# Patient Record
Sex: MALE | Race: WHITE | HISPANIC OR LATINO | Employment: FULL TIME | URBAN - METROPOLITAN AREA
[De-identification: names, ages, dates, MRNs, and addresses within clinical notes are randomized per-mention and may not be internally consistent; named-entity substitution may affect disease eponyms.]

---

## 2024-01-24 ENCOUNTER — THERAPY VISIT (OUTPATIENT)
Dept: PHYSICAL THERAPY | Facility: CLINIC | Age: 24
End: 2024-01-24

## 2024-01-24 DIAGNOSIS — M25.521 RIGHT ELBOW PAIN: Primary | ICD-10-CM

## 2024-01-24 PROCEDURE — 97161 PT EVAL LOW COMPLEX 20 MIN: CPT | Mod: GP | Performed by: PHYSICAL THERAPIST

## 2024-01-24 PROCEDURE — 97110 THERAPEUTIC EXERCISES: CPT | Mod: GP | Performed by: PHYSICAL THERAPIST

## 2024-01-24 PROCEDURE — 20560 NDL INSJ W/O NJX 1 OR 2 MUSC: CPT | Performed by: PHYSICAL THERAPIST

## 2024-01-24 NOTE — PROGRESS NOTES
"PHYSICAL THERAPY EVALUATION  Type of Visit: Evaluation    See electronic medical record for Abuse and Falls Screening details.    Subjective     R elbow pain happened in 2023 when his elbow was hyper-extended during a catch while playing [employer] (Soccer).  He \"babied it\" through the season and has been resting it since November.  Pain in the anterior elbow with quick elbow extension, pain and clicking noted in the posterior elbow with twisting (Patient demonstrates pronation/supination). Patient reports he can lift heavy performing suitcase carry, biceps curls, overhead press without pain, however he notices the R elbow. Patient reports he got an MRI that did not find anything. Patient reports he is wondering if he might have tennis elbow. No pain with gripping noted.  Nothing in past medical history, patient is taking creatine. No medications. Pain is 0/10 at rest, however elevates with use.   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   Hobbies/Interests:  Soccer    Patient goals for therapy:  return to high level activity without pain.     Pain assessment: Location: Anterior/lateral elbow/Ratin/10     Objective   Observation: no visible bruising.  AROM: R elbow flexion 90% with end range pain/discomfort in anterior lateral elbow, L: WNL and pain free, Elbow extension 10 degrees hyperextension, L: WNL and pain free.   PROM: R elbow flexion limited per pain. Extension, pronation, and supination full and pain free.   Palpation: pain and tightness to palpation of " R distal biceps. All other arm and forearm muscles WNL.   Special tests: 3rd resisted digit extension normal.  Resisted digit extension for 4th and 5th digits are somewhat painful along posterior medial forearm.   MMT: Biceps 5/5, Triceps 5/5.   Joint assessment: Radioulnar, radiohumeral, and humeroulnar joints slightly restricted, however did not reproduce pain.     Assessment & Plan   CLINICAL IMPRESSIONS  Medical Diagnosis:      Treatment Diagnosis: R Elbow Pain   Impression/Assessment: Patient is a 23 year old male with R elbow complaints.  The following significant findings have been identified: Pain, Decreased ROM/flexibility, and Decreased joint mobility. These impairments interfere with their ability to perform self care tasks, work tasks, recreational activities, household chores, and driving  as compared to previous level of function.     Clinical Decision Making (Complexity):  Clinical Presentation: Stable/Uncomplicated  Clinical Presentation Rationale: based on medical and personal factors listed in PT evaluation  Clinical Decision Making (Complexity): Low complexity    PLAN OF CARE  Treatment Interventions:  Modalities: Cryotherapy, Dry Needling, E-stim, Hot Pack, Mechanical Traction, Ultrasound  Interventions: Manual Therapy, Neuromuscular Re-education, Therapeutic Activity, Therapeutic Exercise, Self-Care/Home Management    Long Term Goals     PT Goal 1  Goal Identifier: AROM  Goal Description: Patient will demonstrate full AROM/PROM of R elbow without pain  Rationale: to maximize safety and independence with performance of ADLs and functional tasks;to maximize safety and independence within the home;to maximize safety and independence within the community;to maximize safety and independence with self cares  Goal Progress: Pain with end range flexion  Target Date: 02/07/24      Frequency of Treatment: 2 times a week  Duration of Treatment: for 1 week tapering to 1 time a week for 1 week    Education  Assessment:   Learner/Method: Patient;Listening;Demonstration;Reading;Pictures/Video;No Barriers to Learning  Education Comments: Educated on findings of exam and likely frequency/duration of PT POC.    Risks and benefits of evaluation/treatment have been explained.   Patient/Family/caregiver agrees with Plan of Care.     Evaluation Time: 20 minutes low complexity evaluation        Signing Clinician: Sheila Cottrell, PT

## 2024-01-26 PROBLEM — M25.521 RIGHT ELBOW PAIN: Status: ACTIVE | Noted: 2024-01-26

## 2024-01-29 ENCOUNTER — THERAPY VISIT (OUTPATIENT)
Dept: PHYSICAL THERAPY | Facility: CLINIC | Age: 24
End: 2024-01-29

## 2024-01-29 DIAGNOSIS — M25.521 RIGHT ELBOW PAIN: Primary | ICD-10-CM

## 2024-01-29 PROCEDURE — 97110 THERAPEUTIC EXERCISES: CPT | Mod: GP | Performed by: PHYSICAL THERAPIST

## 2024-01-29 PROCEDURE — 20561 NDL INSJ W/O NJX 3+ MUSC: CPT | Mod: GA | Performed by: PHYSICAL THERAPIST

## 2024-02-02 PROBLEM — M25.521 RIGHT ELBOW PAIN: Status: RESOLVED | Noted: 2024-01-26 | Resolved: 2024-02-02

## 2024-02-02 NOTE — PROGRESS NOTES
01/29/24 0500   Appointment Info   Signing clinician's name / credentials Sheila Cottrell, PT, DPT, CMT, OCS   Total/Authorized Visits 3 PT poc(SELF REFERRED)   Visits Used 1   PT Tx Diagnosis R Elbow Pain   Quick Adds Self Referred   Self Referred   Self Referred (PT) Yes   MD order needed by: 4/23/2024   Progress Note/Certification   Start of Care Date 01/24/24   Onset of illness/injury or Date of Surgery 07/01/24  (Patient reports during a game in July.)   Therapy Frequency 2 times a week   Predicted Duration for 1 week tapering to 1 time a week for 1 week   Progress Note Due Date 02/07/24   PT Goal 1   Goal Identifier AROM   Goal Description Patient will demonstrate full AROM/PROM of R elbow without pain   Rationale to maximize safety and independence with performance of ADLs and functional tasks;to maximize safety and independence within the home;to maximize safety and independence within the community;to maximize safety and independence with self cares   Goal Progress Pain with end range flexion. improved active elbow extension post treatment this date.   Target Date 02/07/24   Subjective Report   Subjective Report Elbow feeling the same.   Objective Measures   Objective Measures Objective Measure 1;Objective Measure 2   Objective Measure 1   Objective Measure ROM   Details Elbow extension full with reduced pain during active/quick elbow extension per patient report.  Slight end range discomfort with elbow flexion continues.   Objective Measure 2   Objective Measure Palpation   Details pain and tightness to palpation of wrist extensors/ERCB, anconues, and pronater teres.   PT Modalities   PT Modalities Dry Needling   Dry Needling   Number of tissues 3   Tissue - 1 Wrist extensor bundle   Needle Depth - 1 40mm   Needle Count - 1 2   Position - 1 Supine   Technique - 1 pistoning   Adverse reactions to treatment No   Patient Response/Progress Tolerated well.  Patient saw most improvement of symptoms of Dry  needling of the Anconeus muscle.  reduced pain with quick extension and end range pronation/supination.   Tissue - 3 Anconeus   Needle Depth - 3 30mm   Needle Count - 3 1   Position - 3 Supine   Technique - 3 threading   Tissue - 2 ECRB   Needle Depth - 2 40mm   Needle Count - 2 3   Position - 2 Supine   Technique - 2 pistoning   Dry Needling, Insertion 3 or more muscles (46468) 28   Treatment Interventions (PT)   Interventions Therapeutic Procedure/Exercise   Therapeutic Procedure/Exercise   Therapeutic Procedures: strength, endurance, ROM, flexibillity minutes (72563) 15   Skilled Intervention Verbal cuing and demonstration for proper performance of exercises.   Patient Response/Progress Patient performed well and verbalized understanding of technique.   PTRx Ther Proc 1 Icing   PTRx Ther Proc 1 - Details instructed to keep icing to address symptoms   PTRx Ther Proc 2 Friction Massage   PTRx Ther Proc 2 - Details Instructed in friction massage of wrist extensor bundle.  had patient perform 2 rounds   PTRx Ther Proc 3 Wrist Strengthening Eccentric Extension with Dumb Timmons   PTRx Ther Proc 3 - Details 4lbs 2 x 10 reps   PTRx Ther Proc 4 Forearm Passive Range of Motion Advanced Extensor Stretch   PTRx Ther Proc 4 - Details 4 x 30 sec hold   Education   Learner/Method Patient;Listening;Demonstration;Reading;Pictures/Video;No Barriers to Learning   Education Comments Educated on findings of exam and likely frequency/duration of PT POC.   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  some wrist extensor bundle/ECRB tightness noted and tennis elbow series given to trail for a few days to assess symptom response. Tenderness to palpation of Pronater Teres, however therapist not certified to dry needl this muscle.  patient advised to seek out dry needling of this muscle if it continues to cause pain.  Patient returning to [place] for the start of his pre-season and will  discharge from Physical Therapy in [place] at this time. Patient advised to reach out with any questions or concerns regarding his symptoms.   Total Session Time   Timed Code Treatment Minutes 15   Total Treatment Time (sum of timed and untimed services) 43         DISCHARGE  Reason for Discharge: Patient moving and will no longer be able to attend Therapy at this location.     Equipment Issued: none    Discharge Plan: Patient to continue home program.  Patient to transition care to PT once moved.     Referring Provider:  Referred Self